# Patient Record
Sex: MALE | Race: BLACK OR AFRICAN AMERICAN | NOT HISPANIC OR LATINO | Employment: FULL TIME | URBAN - METROPOLITAN AREA
[De-identification: names, ages, dates, MRNs, and addresses within clinical notes are randomized per-mention and may not be internally consistent; named-entity substitution may affect disease eponyms.]

---

## 2017-11-26 ENCOUNTER — HOSPITAL ENCOUNTER (EMERGENCY)
Facility: HOSPITAL | Age: 37
Discharge: HOME | End: 2017-11-26
Attending: EMERGENCY MEDICINE
Payer: MEDICAID

## 2017-11-26 VITALS
HEIGHT: 74 IN | TEMPERATURE: 98 F | WEIGHT: 190 LBS | BODY MASS INDEX: 24.38 KG/M2 | HEART RATE: 54 BPM | OXYGEN SATURATION: 99 % | RESPIRATION RATE: 16 BRPM | DIASTOLIC BLOOD PRESSURE: 68 MMHG | SYSTOLIC BLOOD PRESSURE: 135 MMHG

## 2017-11-26 DIAGNOSIS — K02.9 DENTAL CARIES: Primary | ICD-10-CM

## 2017-11-26 PROCEDURE — 99283 EMERGENCY DEPT VISIT LOW MDM: CPT | Mod: ,,, | Performed by: EMERGENCY MEDICINE

## 2017-11-26 PROCEDURE — 99283 EMERGENCY DEPT VISIT LOW MDM: CPT

## 2017-11-26 PROCEDURE — 25000003 PHARM REV CODE 250: Performed by: EMERGENCY MEDICINE

## 2017-11-26 RX ORDER — PENICILLIN V POTASSIUM 500 MG/1
500 TABLET, FILM COATED ORAL EVERY 8 HOURS
Qty: 21 TABLET | Refills: 0 | Status: SHIPPED | OUTPATIENT
Start: 2017-11-26 | End: 2017-12-03

## 2017-11-26 RX ORDER — NAPROXEN 500 MG/1
500 TABLET ORAL
Status: COMPLETED | OUTPATIENT
Start: 2017-11-26 | End: 2017-11-26

## 2017-11-26 RX ORDER — NAPROXEN 500 MG/1
500 TABLET ORAL 2 TIMES DAILY PRN
Qty: 20 TABLET | Refills: 0 | Status: SHIPPED | OUTPATIENT
Start: 2017-11-26 | End: 2017-12-06

## 2017-11-26 RX ADMIN — NAPROXEN 500 MG: 500 TABLET ORAL at 07:11

## 2017-11-27 NOTE — ED TRIAGE NOTES
Presents to ER with left sided facial swelling associated with possible abscessed teeth x3 since this am.  States that he had to miss work today due to the pain.    GENERAL: The patient is well-developed and well-nourished in no apparent distress. Alert and oriented x4.                                                HEENT: Head is normocephalic and atraumatic. Extraocular muscles are intact. Pupils are equal, round, and reactive to light and accommodation. Nares appeared normal. Mouth is well hydrated and without lesions. Mucous membranes are moist. Posterior pharynx clear of any exudate or lesions.    NECK: Supple. No carotid bruits. No lymphadenopathy or thyromegaly.    LUNGS: Clear to auscultation.    HEART: Regular rate and rhythm without murmur.     ABDOMEN: Soft, nontender, and nondistended. Positive bowel sounds. No hepatosplenomegaly was noted.     EXTREMITIES: Without any cyanosis, clubbing, rash, lesions or edema.     NEUROLOGIC: Cranial nerves II through XII are grossly intact.     PSYCHIATRIC: Flat affect, but denies suicidal or homicidal ideations.    SKIN: No ulceration or induration present.

## 2017-11-27 NOTE — DISCHARGE INSTRUCTIONS
DENTAL RESOURCES      [facility]       [phone]     [facility] 8-4 Monday - Friday       [phone]     [facility]       [phone]     [facility]                                 0-6 years                                                                                             7-13 years     [phone]     [facility]    [facility]     [phone]     [facility]       [phone]     [facility]    [address], Mon-Fri not on Wed  8am-4pm    Over 60 years old living [place]           [phone]     [facility]    [address] [facility] [place]     [phone]     [facility] [place]       [phone]     [facility]  [address]       [phone]     [facility]       [phone]

## 2017-11-27 NOTE — ED PROVIDER NOTES
"Encounter Date: 11/26/2017    SCRIBE #1 NOTE: I, Oksanadotty Leon, am scribing for, and in the presence of, Dr. Shepherd.       History     Chief Complaint   Patient presents with    Dental Pain     Pt states "It's in my gum. I got nothing but broken teeth in there."      Time patient was seen by the provider: 6:52 PM      This is a 37 y.o. male with no PMHx who presents to the ED with a chief complaint of left-sided dental problem. Patient endorses intermittent gum pain "for a long time", worsened since yesterday. Additionally endorses left facial swelling. Took two flexeril yesterday, no relief. He states that there's tooth chips in his gums. He states that he has seen a dentist for the right side of his mouth, but not the left. Denies fever, chills.       The history is provided by the patient and medical records.     Review of patient's allergies indicates:  No Known Allergies  History reviewed. No pertinent past medical history.  History reviewed. No pertinent surgical history.  Family History   Problem Relation Age of Onset    No Known Problems Mother     No Known Problems Father      Social History   Substance Use Topics    Smoking status: Current Every Day Smoker     Packs/day: 1.00     Years: 5.00     Types: Cigarettes    Smokeless tobacco: Never Used    Alcohol use No     Review of Systems   Constitutional: Negative for chills, diaphoresis and fever.   HENT: Positive for dental problem and facial swelling.    Eyes: Negative for photophobia and visual disturbance.   Respiratory: Negative for cough and shortness of breath.    Cardiovascular: Negative for chest pain and palpitations.   Gastrointestinal: Negative for abdominal pain, diarrhea, nausea and vomiting.   Endocrine: Negative for polydipsia, polyphagia and polyuria.   Musculoskeletal: Negative for back pain and neck pain.   Skin: Negative for rash.   Neurological: Negative for syncope and light-headedness.       Physical Exam     Initial Vitals " [11/26/17 1712]   BP Pulse Resp Temp SpO2   135/68 (!) 54 16 98.4 °F (36.9 °C) 99 %      MAP       90.33         Physical Exam    Nursing note and vitals reviewed.  Constitutional: He appears well-developed and well-nourished. He is not diaphoretic. No distress.   HENT:   Patient with exposed roots of left maxillary molars. There's significant swelling along the cheek. There's left facial swelling. No trismus, phonation is normal. Gingiva without any fluctuance amenable for drainage. Erythema throughout the gingiva.    Neck: Normal range of motion. Neck supple.    No submandibular or cervical lymphadenopathy.    Cardiovascular: Normal rate, regular rhythm and normal heart sounds. Exam reveals no gallop and no friction rub.    No murmur heard.  Pulmonary/Chest: Breath sounds normal. No respiratory distress. He has no wheezes. He has no rhonchi. He has no rales.   Abdominal: Soft. There is no tenderness.   Musculoskeletal: Normal range of motion.   Lymphadenopathy:     He has no cervical adenopathy.   Neurological: He is alert and oriented to person, place, and time.   Skin: Skin is warm and dry.         ED Course   Procedures  Labs Reviewed - No data to display          Medical Decision Making:   History:   Old Medical Records: I decided to obtain old medical records.  Initial Assessment:   37 year old male presents with dentalgia. There are multiple dental caries with exposed roots. I see no abscess amenable for drainage. No indication of Yonathan's angina. His airway is stable.   ED Management:  Will initiate course of penicillin for dental infection. Will discharge with NSAIDs and follow up with Dentistry.             Scribe Attestation:   Scribe #1: I performed the above scribed service and the documentation accurately describes the services I performed. I attest to the accuracy of the note.    Attending Attestation:           Physician Attestation for Scribe:      Comments: I, Dr. Cesar Shepherd, personally  performed the services described in this documentation. All medical record entries made by the scribe were at my direction and in my presence.  I have reviewed the chart and agree that the record reflects my personal performance and is accurate and complete. Cesar Shepherd MD.  9:41 PM 11/26/2017              ED Course      Clinical Impression:   The encounter diagnosis was Dental caries.    Disposition:   Disposition: Discharged  Condition: Stable                        Cesar Shepherd MD  11/26/17 4131